# Patient Record
Sex: MALE | Race: WHITE
[De-identification: names, ages, dates, MRNs, and addresses within clinical notes are randomized per-mention and may not be internally consistent; named-entity substitution may affect disease eponyms.]

---

## 2020-06-11 ENCOUNTER — HOSPITAL ENCOUNTER (OUTPATIENT)
Dept: HOSPITAL 11 - JP.SDS | Age: 85
Discharge: HOME | End: 2020-06-11
Attending: OPHTHALMOLOGY
Payer: MEDICARE

## 2020-06-11 DIAGNOSIS — I10: ICD-10-CM

## 2020-06-11 DIAGNOSIS — H26.9: Primary | ICD-10-CM

## 2020-06-11 PROCEDURE — V2632 POST CHMBR INTRAOCULAR LENS: HCPCS

## 2020-06-11 PROCEDURE — 66984 XCAPSL CTRC RMVL W/O ECP: CPT

## 2020-06-11 NOTE — OR
DATE OF PROCEDURE:  06/11/2020

 

SURGEON:  Alexandra Romero MD

 

POSTOPERATIVE CARE:  Postoperative care will be provided mainly at the [address] [place] [address] Eye

[facility] in conjunction with [facility].

 

PREOPERATIVE DIAGNOSIS:  Cataract, right eye.

 

POSTOPERATIVE DIAGNOSIS:  Cataract, right eye.

 

PROCEDURE:  Phacoemulsification with intraocular lens placement, right eye.

 

ANESTHESIA:  Topical and intracameral.

 

ESTIMATED BLOOD LOSS:  Minimal.

 

COMPLICATIONS:  None.

 

PATHOLOGY SPECIMENS:  None.

 

SURGICAL FINDINGS:  None.

 

INDICATION FOR PROCEDURE:  The patient is an 85-year-old male with history of a visually

significant cataract in the right eye, which interfered with activities of daily living.

This consisted of a nuclear sclerosis cataract.  Following careful discussion of the risks,

benefits and alternatives to cataract extraction with intraocular lens placement including

blindness and death, the patient elected to proceed, and informed, written consent was

obtained prior to the procedure.

 

DESCRIPTION OF THE PROCEDURE:  The patient was previously identified, and a suresh placed

above the right eye.  All sources, including the patient, indicated that the right eye was

the correct eye.  The patient was subsequently taken to the operating room where standard

monitors were applied.  The patient was then prepped and draped in the usual sterile fashion

for ophthalmic surgery.  Attention was first directed at the 12 o'clock position where a

paracentesis port was fashioned.  Shugar solution followed by Viscoat was instilled into the

eye.  Attention was then directed to the 8:30 position where a triplanar incision was made

in a near-clear manner using a keratome.  A continuous capsulorrhexis was then made using a

combination of the cystotome and Utrata forceps.  Hydrodissection was achieved using a

balanced salt solution, and the lens rotated nicely.  Phacoemulsification was then done

using a modified divide-and-conquer technique without complication.  Phaco time was 11.56

CDE.  The remaining cortex was removed using the irrigation/aspiration handpiece.  Provisc

was then instilled into the eye.  A Technis lens, model [id number], at 25.5 diopters was then

placed in the capsular bag using an Emerald injector.  The remaining viscoelastic was

removed using the irrigation/aspiration forceps.  All wounds were then checked and found to

be watertight.  The lid speculum and drapes were removed.

Maxitrol ointment was placed in the patient's right eye, and the eye was shielded.  The

patient tolerated the procedure well.  The patient was instructed to follow up tomorrow.

 

All needle and sponge counts were correct at the end of the procedure.

 

 

 

 

Alexandra Romero MD

DD:  06/11/2020 08:09:38

DT:  06/11/2020 10:46:27

Job #:  [id number]

## 2020-06-25 ENCOUNTER — HOSPITAL ENCOUNTER (OUTPATIENT)
Dept: HOSPITAL 11 - JP.SDS | Age: 85
Discharge: HOME | End: 2020-06-25
Attending: OPHTHALMOLOGY
Payer: MEDICARE

## 2020-06-25 DIAGNOSIS — H25.12: Primary | ICD-10-CM

## 2020-06-25 DIAGNOSIS — I10: ICD-10-CM

## 2020-06-25 PROCEDURE — V2632 POST CHMBR INTRAOCULAR LENS: HCPCS

## 2020-06-25 PROCEDURE — 66984 XCAPSL CTRC RMVL W/O ECP: CPT

## 2020-06-25 NOTE — OR
DATE OF PROCEDURE:  06/25/2020

 

SURGEON:  Alexandra Romero MD

 

POSTOPERATIVE CARE:  Postoperative care will be provided mainly at the [address] [place] [address] Eye

[facility] in conjunction with [facility].

 

PREOPERATIVE DIAGNOSIS:  Cataract, left eye.

 

POSTOPERATIVE DIAGNOSIS:  Cataract, left eye.

 

PROCEDURE:  Phacoemulsification with intraocular lens placement, left eye.

 

ANESTHESIA:  Topical and intracameral.

 

ESTIMATED BLOOD LOSS:  Minimal.

 

COMPLICATIONS:  None.

 

PATHOLOGY SPECIMENS:  None.

 

SURGICAL FINDINGS:  None.

 

INDICATION FOR PROCEDURE:  The patient is an 85-year-old male with history of a 
visually

significant cataract in the left eye, which interfered with activities of daily 
living.

This consisted of a nuclear sclerosis cataract.  Following careful discussion of
the risks,

benefits and alternatives to cataract extraction with intraocular lens placement
including

blindness and death, the patient elected to proceed, and informed, written 
consent was

obtained prior to the procedure.

 

DESCRIPTION OF THE PROCEDURE:  The patient was previously identified, and a suresh
placed

above the left eye.  All sources, including the patient, indicated that the left
eye was the

correct eye.  The patient was subsequently taken to the operating room where 
standard

monitors were applied.  The patient was then prepped and draped in the usual 
sterile fashion

for ophthalmic surgery.  Attention was first directed at the 12 o'clock position
where a

paracentesis port was fashioned.  Shugar solution followed by Viscoat was 
instilled into the

eye.  Attention was then directed to the 8:30 position where a triplanar 
incision was made

in a near-clear manner using a keratome.  A continuous capsulorrhexis was then 
made using a

combination of the cystotome and Utrata forceps.  Hydrodissection was achieved 
using a

balanced salt solution, and the lens rotated nicely.  Phacoemulsification was 
then done

using a modified divide-and-conquer technique without complication.  Phaco time 
was 7.66

CDE. The remaining cortex was removed using the irrigation/aspiration handpiece.
 Provisc

was then instilled into the eye.  A Technis lens, model [id number], at 25.5 Diopters
was then

placed in the capsular bag using an Emerald injector.  The remaining 
viscoelastic was

removed using the irrigation/aspiration forceps.  All wounds were then checked 
and found to

be watertight.  The lid speculum and drapes were removed.

Maxitrol ointment was placed in the patient's left eye, and the eye was 
shielded.  The

patient tolerated the procedure well.  The patient was instructed to follow up 
tomorrow.

 

All needle and sponge counts were correct at the end of the procedure.

 

 

 

 

Alexandra Romero MD

DD:  06/25/2020 09:09:39

DT:  06/25/2020 10:25:17

Job #:  [id number]

MTDD

## 2022-05-16 ENCOUNTER — HOSPITAL ENCOUNTER (INPATIENT)
Dept: HOSPITAL 11 - JP.ED | Age: 87
LOS: 5 days | Discharge: HOME | DRG: 436 | End: 2022-05-21
Attending: INTERNAL MEDICINE | Admitting: INTERNAL MEDICINE
Payer: MEDICARE

## 2022-05-16 DIAGNOSIS — E78.00: ICD-10-CM

## 2022-05-16 DIAGNOSIS — Z98.49: ICD-10-CM

## 2022-05-16 DIAGNOSIS — I11.0: ICD-10-CM

## 2022-05-16 DIAGNOSIS — C25.9: ICD-10-CM

## 2022-05-16 DIAGNOSIS — C78.00: ICD-10-CM

## 2022-05-16 DIAGNOSIS — Z20.822: ICD-10-CM

## 2022-05-16 DIAGNOSIS — N18.4: ICD-10-CM

## 2022-05-16 DIAGNOSIS — A41.9: Primary | ICD-10-CM

## 2022-05-16 DIAGNOSIS — C25.0: ICD-10-CM

## 2022-05-16 DIAGNOSIS — Z79.899: ICD-10-CM

## 2022-05-16 DIAGNOSIS — E88.09: ICD-10-CM

## 2022-05-16 DIAGNOSIS — I50.9: ICD-10-CM

## 2022-05-16 DIAGNOSIS — H35.30: ICD-10-CM

## 2022-05-16 DIAGNOSIS — H91.90: ICD-10-CM

## 2022-05-16 DIAGNOSIS — I13.0: ICD-10-CM

## 2022-05-16 DIAGNOSIS — Z79.4: ICD-10-CM

## 2022-05-16 LAB
SARS-COV-2 RNA RESP QL NAA+PROBE: NEGATIVE
TROPONIN I SERPL HS-MCNC: 31.3 PG/ML (ref ?–60.3)

## 2022-05-16 PROCEDURE — P9047 ALBUMIN (HUMAN), 25%, 50ML: HCPCS

## 2022-05-18 RX ADMIN — ALBUMIN HUMAN SCH MLS/HR: 250 SOLUTION INTRAVENOUS at 11:25

## 2022-05-18 RX ADMIN — ALBUMIN HUMAN SCH MLS/HR: 250 SOLUTION INTRAVENOUS at 21:13

## 2022-05-18 RX ADMIN — INSULIN LISPRO PRN UNITS: 100 INJECTION, SOLUTION INTRAVENOUS; SUBCUTANEOUS at 17:13

## 2022-05-18 RX ADMIN — INSULIN LISPRO PRN UNITS: 100 INJECTION, SOLUTION INTRAVENOUS; SUBCUTANEOUS at 22:50

## 2022-05-19 RX ADMIN — ALBUMIN HUMAN SCH MLS/HR: 250 SOLUTION INTRAVENOUS at 21:15

## 2022-05-19 RX ADMIN — ALBUMIN HUMAN SCH MLS/HR: 250 SOLUTION INTRAVENOUS at 08:13

## 2022-05-19 RX ADMIN — INSULIN LISPRO PRN UNITS: 100 INJECTION, SOLUTION INTRAVENOUS; SUBCUTANEOUS at 08:08

## 2022-05-19 RX ADMIN — INSULIN LISPRO PRN UNITS: 100 INJECTION, SOLUTION INTRAVENOUS; SUBCUTANEOUS at 21:57

## 2022-05-20 RX ADMIN — INSULIN LISPRO PRN UNITS: 100 INJECTION, SOLUTION INTRAVENOUS; SUBCUTANEOUS at 17:31

## 2022-05-20 RX ADMIN — INSULIN LISPRO PRN UNITS: 100 INJECTION, SOLUTION INTRAVENOUS; SUBCUTANEOUS at 23:29

## 2022-05-20 RX ADMIN — POTASSIUM CHLORIDE SCH MEQ: 1500 TABLET, EXTENDED RELEASE ORAL at 16:15

## 2022-05-20 RX ADMIN — ALBUMIN HUMAN SCH MLS/HR: 250 SOLUTION INTRAVENOUS at 21:39

## 2022-05-20 RX ADMIN — POTASSIUM CHLORIDE SCH MEQ: 1500 TABLET, EXTENDED RELEASE ORAL at 21:38

## 2022-05-20 RX ADMIN — ALBUMIN HUMAN SCH MLS/HR: 250 SOLUTION INTRAVENOUS at 08:42

## 2022-05-20 RX ADMIN — POTASSIUM CHLORIDE SCH MEQ: 1500 TABLET, EXTENDED RELEASE ORAL at 18:03

## 2022-05-21 RX ADMIN — INSULIN LISPRO PRN UNITS: 100 INJECTION, SOLUTION INTRAVENOUS; SUBCUTANEOUS at 11:34

## 2022-05-21 RX ADMIN — ALBUMIN HUMAN SCH: 250 SOLUTION INTRAVENOUS at 10:40
